# Patient Record
Sex: MALE | Race: WHITE | NOT HISPANIC OR LATINO | Employment: FULL TIME | URBAN - METROPOLITAN AREA
[De-identification: names, ages, dates, MRNs, and addresses within clinical notes are randomized per-mention and may not be internally consistent; named-entity substitution may affect disease eponyms.]

---

## 2021-10-12 ENCOUNTER — HOSPITAL ENCOUNTER (OUTPATIENT)
Dept: GENERAL RADIOLOGY | Facility: HOSPITAL | Age: 46
Discharge: HOME | End: 2021-10-12
Admitting: FAMILY MEDICINE

## 2021-10-12 DIAGNOSIS — U07.1 PNEUMONIA DUE TO COVID-19 VIRUS: ICD-10-CM

## 2021-10-12 DIAGNOSIS — J12.82 PNEUMONIA DUE TO COVID-19 VIRUS: ICD-10-CM

## 2021-10-12 PROCEDURE — 71046 X-RAY EXAM CHEST 2 VIEWS: CPT

## 2021-11-30 ENCOUNTER — OFFICE VISIT (OUTPATIENT)
Dept: FAMILY MEDICINE CLINIC | Facility: CLINIC | Age: 46
End: 2021-11-30

## 2021-11-30 VITALS
HEART RATE: 82 BPM | OXYGEN SATURATION: 98 % | TEMPERATURE: 97.1 F | RESPIRATION RATE: 16 BRPM | WEIGHT: 233 LBS | BODY MASS INDEX: 31.6 KG/M2 | DIASTOLIC BLOOD PRESSURE: 84 MMHG | SYSTOLIC BLOOD PRESSURE: 140 MMHG

## 2021-11-30 DIAGNOSIS — N52.9 ERECTILE DYSFUNCTION, UNSPECIFIED ERECTILE DYSFUNCTION TYPE: Primary | ICD-10-CM

## 2021-11-30 DIAGNOSIS — R03.0 ELEVATED BP WITHOUT DIAGNOSIS OF HYPERTENSION: ICD-10-CM

## 2021-11-30 DIAGNOSIS — T78.40XA ALLERGY, INITIAL ENCOUNTER: ICD-10-CM

## 2021-11-30 DIAGNOSIS — F32.A DEPRESSION, UNSPECIFIED DEPRESSION TYPE: ICD-10-CM

## 2021-11-30 PROCEDURE — 99213 OFFICE O/P EST LOW 20 MIN: CPT

## 2021-11-30 RX ORDER — DESVENLAFAXINE 100 MG/1
50 TABLET, EXTENDED RELEASE ORAL DAILY
COMMUNITY
Start: 2021-11-22 | End: 2021-11-30 | Stop reason: SDUPTHER

## 2021-11-30 RX ORDER — SILDENAFIL 50 MG/1
50 TABLET, FILM COATED ORAL DAILY PRN
COMMUNITY
End: 2021-11-30 | Stop reason: SDUPTHER

## 2021-11-30 RX ORDER — BUPROPION HYDROCHLORIDE 300 MG/1
300 TABLET ORAL DAILY
COMMUNITY
Start: 2021-11-17

## 2021-11-30 RX ORDER — SILDENAFIL 50 MG/1
50 TABLET, FILM COATED ORAL DAILY PRN
Qty: 30 TABLET | Refills: 3 | Status: SHIPPED | OUTPATIENT
Start: 2021-11-30 | End: 2022-04-18 | Stop reason: SDUPTHER

## 2021-11-30 NOTE — ASSESSMENT & PLAN NOTE
Stable on Viagra.  Patient has no cardiac history and is not on any nitrates.  Patient requesting refill today.  Reordering.  Counseled patient not to take more than 100 mg in 1 dose and not more than once a day.

## 2021-11-30 NOTE — PROGRESS NOTES
Chief Complaint  Establish Care (wants to get Psa and other blood work that is required checked, Has a red sore spot at the end of his nose ) and Sore Throat (Congestion,)    Subjective          Henry Wade presents to [facility]  History of Present Illness     Patient presents the office today to establish care.  Patient was previously seeing a provider that was in private practice and is 1 to go somewhere with a new location.  Does not have old records at hand right now.    Current medical issues include depression and erectile dysfunction.  Reviewed past medical, surgical, family history along with allergies and medications.    Patient has a history of depression following the death of his wife last December.  The death was sudden traumatic due to close brain injury and patient says since then he has been seeing a grief counselor and psychiatrist which have both greatly helped him.  Patient is currently taking Pristiq 50 mg and Wellbutrin 300 mg daily and is tolerating it well.  No suicidal thoughts.  Patient feels like he is doing very well with this regimen.    Patient said he also has a history of erectile dysfunction.  He said this is a new issue since the death of his wife and feels like it might be related.  Patient was [facility]  Cannot remember the name of the physician at this time.  At that time he started on Viagra and he says he has to take up to 100 mg in order for it to work.  Patient also said he had his testosterone tested at that visit and it was normal.  Patient was wondering about getting a refill of Viagra today.  Patient is already previously had a full cardiac work-up including EKG and stress test through Dr. TERRAZAS in 2019 and all was clear.  Patient tolerates Viagra well with just some flushing with the medication.    Patient is also seen that he has a dry scratchy throat that he has had recently.  No associated  fever, chills, muscle aches or sinus pressure congestion.  Patient believes it may be allergy related at this time.    Blood pressure slightly elevated in the office today 140/84.  Patient usually has blood pressure is not that high.  However he has recently increased his Wellbutrin dose and wonders if it is associated with that.  No chest pain, headache, dizziness or shortness of breath.      Objective   Vital Signs:   /84   Pulse 82   Temp 97.1 °F (36.2 °C)   Resp 16   Wt 106 kg (233 lb)   SpO2 98%   BMI 31.60 kg/m²     Physical Exam  Vitals reviewed.   Constitutional:       General: He is not in acute distress.  HENT:      Head: Normocephalic.   Cardiovascular:      Rate and Rhythm: Normal rate.      Heart sounds: Normal heart sounds.   Pulmonary:      Effort: Pulmonary effort is normal.      Breath sounds: Normal breath sounds.   Musculoskeletal:         General: Normal range of motion.   Skin:     General: Skin is warm and dry.   Neurological:      Mental Status: He is alert.   Psychiatric:         Mood and Affect: Mood normal.        Result Review :                 Assessment and Plan    Diagnoses and all orders for this visit:    1. Erectile dysfunction, unspecified erectile dysfunction type (Primary)  Assessment & Plan:  Stable on Viagra.  Patient has no cardiac history and is not on any nitrates.  Patient requesting refill today.  Reordering.  Counseled patient not to take more than 100 mg in 1 dose and not more than once a day.    Orders:  -     sildenafil (VIAGRA) 50 MG tablet; Take 1 tablet by mouth Daily As Needed for Erectile Dysfunction.  Dispense: 30 tablet; Refill: 3    2. Depression, unspecified depression type  Assessment & Plan:  Well-controlled on Pristiq and Wellbutrin.  Continue current regimen follow-up with psychiatry.      3. Elevated BP without diagnosis of hypertension  Assessment & Plan:  Counseled patient to monitor blood pressure at home.  We will follow-up in 3 months and  see how blood pressure is running.  If it is remaining elevated may need to start medication.      4. Allergy, initial encounter  Assessment & Plan:  At this time since patient has no other associated symptoms the irritated throat is probably due to allergies.  Continue any over-the-counter allergy medication and follow-up if it does not get better.      Pleasant 46-year-old male presents the clinic today to establish care.  Current medical issues include erectile dysfunction, depression, elevated blood pressure and allergies.  See plan above.  Patient to follow-up in 3 months for annual physical exam with lab work.      Follow Up   Return in about 3 months (around 2/28/2022) for Annual physical.  Patient was given instructions and counseling regarding his condition or for health maintenance advice. Please see specific information pulled into the AVS if appropriate.     Medical assistant and I wore mask and eyewear protection during entire encounter.  Patient wore mask.      Electronically signed by JUNE Olivares, 11/30/21, 4:20 PM EST.

## 2021-11-30 NOTE — ASSESSMENT & PLAN NOTE
At this time since patient has no other associated symptoms the irritated throat is probably due to allergies.  Continue any over-the-counter allergy medication and follow-up if it does not get better.

## 2021-11-30 NOTE — ASSESSMENT & PLAN NOTE
Counseled patient to monitor blood pressure at home.  We will follow-up in 3 months and see how blood pressure is running.  If it is remaining elevated may need to start medication.

## 2022-03-02 ENCOUNTER — TELEPHONE (OUTPATIENT)
Dept: FAMILY MEDICINE CLINIC | Facility: CLINIC | Age: 47
End: 2022-03-02

## 2022-03-02 NOTE — TELEPHONE ENCOUNTER
Patient will need to have testosterone levels checked and further testing prior to starting testosterone therapy. It looks like he has an appointment with his PCP on 3/15.     Electronically signed by JUNE Zelaya, 03/02/22, 2:54 PM EST.

## 2022-03-02 NOTE — TELEPHONE ENCOUNTER
Provider: PENNIE BOSWELL  Caller: ROBBIE GODOY  Relationship to Patient: PATIENT  Phone Number: [phone]  Reason for Call: PATIENT WOULD LIKE TO KNOW IF HE CAN GET TESTOSTERONE REPLACEMENT THERAPY HERE AT THE OFFICE.     PLEASE CALL AND ADVISE

## 2022-03-15 ENCOUNTER — OFFICE VISIT (OUTPATIENT)
Dept: FAMILY MEDICINE CLINIC | Facility: CLINIC | Age: 47
End: 2022-03-15

## 2022-03-15 VITALS
HEIGHT: 72 IN | SYSTOLIC BLOOD PRESSURE: 120 MMHG | TEMPERATURE: 97.3 F | WEIGHT: 235 LBS | RESPIRATION RATE: 16 BRPM | BODY MASS INDEX: 31.83 KG/M2 | OXYGEN SATURATION: 99 % | HEART RATE: 59 BPM | DIASTOLIC BLOOD PRESSURE: 84 MMHG

## 2022-03-15 DIAGNOSIS — R53.82 CHRONIC FATIGUE: Primary | ICD-10-CM

## 2022-03-15 DIAGNOSIS — F32.A DEPRESSION, UNSPECIFIED DEPRESSION TYPE: ICD-10-CM

## 2022-03-15 DIAGNOSIS — Z12.11 COLON CANCER SCREENING: ICD-10-CM

## 2022-03-15 DIAGNOSIS — R39.198 URINARY STREAM SLOWING: ICD-10-CM

## 2022-03-15 DIAGNOSIS — Z11.59 ENCOUNTER FOR HEPATITIS C SCREENING TEST FOR LOW RISK PATIENT: ICD-10-CM

## 2022-03-15 DIAGNOSIS — R68.82 LOW LIBIDO: ICD-10-CM

## 2022-03-15 DIAGNOSIS — M54.50 ACUTE RIGHT-SIDED LOW BACK PAIN WITHOUT SCIATICA: ICD-10-CM

## 2022-03-15 DIAGNOSIS — Z12.5 SCREENING PSA (PROSTATE SPECIFIC ANTIGEN): ICD-10-CM

## 2022-03-15 DIAGNOSIS — Z13.6 SCREENING, ISCHEMIC HEART DISEASE: ICD-10-CM

## 2022-03-15 PROCEDURE — 99214 OFFICE O/P EST MOD 30 MIN: CPT

## 2022-03-15 RX ORDER — DESVENLAFAXINE 100 MG/1
1 TABLET, EXTENDED RELEASE ORAL DAILY
COMMUNITY
Start: 2022-01-03

## 2022-03-15 RX ORDER — CYCLOBENZAPRINE HCL 5 MG
5 TABLET ORAL 3 TIMES DAILY PRN
Qty: 30 TABLET | Refills: 0 | Status: SHIPPED | OUTPATIENT
Start: 2022-03-15 | End: 2022-03-24 | Stop reason: SDUPTHER

## 2022-03-15 NOTE — ASSESSMENT & PLAN NOTE
This has been a chronic issue of the past several years that is just slowly getting worse over time.  No acute issues.  Since patient already is seeing neurologist counseled him to follow-up with his urologist.  Checking a PSA with his lab work.

## 2022-03-15 NOTE — PROGRESS NOTES
Chief Complaint  Labs Only (Testing for Low T levels. Some blood panel, discuss prostate check.) and Back Pain (Low back side shooting pain radiates down leg, when walking started this morning.)    Subjective          Henry Wade presents to [facility]  History of Present Illness     Patient presents the office today to discuss fatigue, low back pain, and new urinary symptoms.  Patient was last seen by me 3 months ago where we discussed his depression, erectile dysfunction, and elevated blood pressure.  Today blood pressure is well controlled without any medication.  Patient does see a urologist yearly for his rectal dysfunction and is on the Viagra as needed which he tolerates well with no side effects.  For patient's depression he is seeing psychiatry every 3 months and currently on Pristiq 100 and Wellbutrin 300.  Tolerating both these fine no suicidal thoughts.    Fatigue-patient feels like he has low energy along with low libido and low drive.  Patient's wife did suddenly passed away 15 months ago and this may all be related to continuing the grieving process and some depression from it.  However patient would also like to get his testosterone levels checked and see if there could be another cause of his fatigue.  Patient denies any dry skin, constipation, or weight gain.    Patient also said that 2 days ago he felt like he strained the right side of his low back when doing dead lifts while working out.  This morning he woke up and had a sharp pain in his right lower back that radiates down the back of his right thigh.  Patient denies any numbness or tingling in the groin, loss of bowel or bladder function, or weakness associated.  Patient feels like he probably just pulled something and it will ease up over time.  Patient did take some ibuprofen this morning which was helpful.  However patient was wondering about other medication that could help with the pain until this  "resolves.  Patient still has full range of motion but does have pinching down the leg with straightening out the right leg.    Patient also wanted to discuss slowing of urinary stream.  Patient said that this is not an acute issue but over the past several years he is noticed that it slowly started to slow down.  Patient is worried about possible development of benign prostatic hyperplasia.  Patient does see a urologist yearly and is needing to follow-up with them to discuss this further.  No acute pain, trouble urinating, dribbling, urgency, or fever.      Objective   Vital Signs:   /84   Pulse 59   Temp 97.3 °F (36.3 °C)   Resp 16   Ht 182.9 cm (72\")   Wt 107 kg (235 lb)   SpO2 99%   BMI 31.87 kg/m²     Physical Exam  Vitals reviewed.   Constitutional:       General: He is not in acute distress.  HENT:      Head: Normocephalic.   Cardiovascular:      Rate and Rhythm: Normal rate and regular rhythm.      Pulses: Normal pulses.      Heart sounds: Normal heart sounds.   Pulmonary:      Effort: Pulmonary effort is normal.      Breath sounds: Normal breath sounds.   Musculoskeletal:      Lumbar back: Tenderness present. No swelling or deformity. Positive right straight leg raise test.   Skin:     General: Skin is dry.   Neurological:      General: No focal deficit present.      Mental Status: He is alert.   Psychiatric:         Mood and Affect: Mood normal.        Result Review :                 Assessment and Plan    Diagnoses and all orders for this visit:    1. Chronic fatigue (Primary)  Assessment & Plan:  Patient has had chronic fatigue for the past year at least.  Patient also has been struggling with depression which is mostly controlled and managed with psychiatry.  However he wants to look into other reasons for the fatigue.  Patient returning for lab only appointment fasting.  We will follow-up when labs have resulted.    Orders:  -     TSH; Future  -     CBC and Differential; Future  -     " Comprehensive metabolic panel; Future  -     Iron; Future  -     Testosterone, free, total; Future  -     Vitamin B12; Future  -     Vitamin D 1,25 dihydroxy; Future    2. Low libido  Assessment & Plan:  Checking lab work including testosterone.  Discussed that patient may need to also talk to his psychiatrist about his medication regimen since antidepressants can also lead to low libido.    Orders:  -     Testosterone, free, total; Future    3. Acute right-sided low back pain without sciatica  Assessment & Plan:  Patient does have range of motion and no red flags.  Probably just pulled a muscle while working out.  Sending in diclofenac and Flexeril for the pain.  Counseled patient to not take diclofenac with any other NSAIDs and to watch for any signs or symptoms of bleeding.  Also to only take Flexeril at home that can cause you to be drowsy.  Gave patient handout for low back pain exercises and stretches.  Following up with patient in 1 month to see how pain is progressed.  Potentially will need physical therapy referral if not getting any better.    Orders:  -     diclofenac (VOLTAREN) 50 MG EC tablet; Take 1 tablet by mouth 2 (Two) Times a Day for 10 days.  Dispense: 20 tablet; Refill: 0  -     cyclobenzaprine (FLEXERIL) 5 MG tablet; Take 1 tablet by mouth 3 (Three) Times a Day As Needed for Muscle Spasms.  Dispense: 30 tablet; Refill: 0    4. Urinary stream slowing  Assessment & Plan:  This has been a chronic issue of the past several years that is just slowly getting worse over time.  No acute issues.  Since patient already is seeing neurologist counseled him to follow-up with his urologist.  Checking a PSA with his lab work.    Orders:  -     PSA Screen; Future    5. Depression, unspecified depression type  Assessment & Plan:  Currently managed by psychiatry.  Patient overall feels like it is well controlled with no suicidal thoughts.  However he is concerned that his medication regimen may be contributing  to his fatigue and low libido.  Discussed talking to his psychiatrist about this.      6. Colon cancer screening  -     Cologuard - Stool, Per Rectum; Future    7. Screening, ischemic heart disease  -     Lipid Panel; Future    8. Encounter for hepatitis C screening test for low risk patient  -     Hepatitis C antibody; Future    9. Screening PSA (prostate specific antigen)  -     PSA Screen; Future      Follow Up   Return in about 1 month (around 4/15/2022) for Annual physical.  Patient was given instructions and counseling regarding his condition or for health maintenance advice. Please see specific information pulled into the AVS if appropriate.     Medical assistant and I wore mask and eyewear protection during entire encounter.  Patient wore mask.      Electronically signed by JUNE Olivares, 03/15/22, 2:22 PM EDT.

## 2022-03-15 NOTE — ASSESSMENT & PLAN NOTE
Patient has had chronic fatigue for the past year at least.  Patient also has been struggling with depression which is mostly controlled and managed with psychiatry.  However he wants to look into other reasons for the fatigue.  Patient returning for lab only appointment fasting.  We will follow-up when labs have resulted.

## 2022-03-15 NOTE — ASSESSMENT & PLAN NOTE
Patient does have range of motion and no red flags.  Probably just pulled a muscle while working out.  Sending in diclofenac and Flexeril for the pain.  Counseled patient to not take diclofenac with any other NSAIDs and to watch for any signs or symptoms of bleeding.  Also to only take Flexeril at home that can cause you to be drowsy.  Gave patient handout for low back pain exercises and stretches.  Following up with patient in 1 month to see how pain is progressed.  Potentially will need physical therapy referral if not getting any better.

## 2022-03-15 NOTE — ASSESSMENT & PLAN NOTE
Currently managed by psychiatry.  Patient overall feels like it is well controlled with no suicidal thoughts.  However he is concerned that his medication regimen may be contributing to his fatigue and low libido.  Discussed talking to his psychiatrist about this.

## 2022-03-15 NOTE — ASSESSMENT & PLAN NOTE
Checking lab work including testosterone.  Discussed that patient may need to also talk to his psychiatrist about his medication regimen since antidepressants can also lead to low libido.

## 2022-03-16 ENCOUNTER — TELEPHONE (OUTPATIENT)
Dept: FAMILY MEDICINE CLINIC | Facility: CLINIC | Age: 47
End: 2022-03-16

## 2022-03-16 DIAGNOSIS — M54.41 ACUTE RIGHT-SIDED LOW BACK PAIN WITH RIGHT-SIDED SCIATICA: Primary | ICD-10-CM

## 2022-03-16 DIAGNOSIS — Z13.6 SCREENING, ISCHEMIC HEART DISEASE: ICD-10-CM

## 2022-03-16 DIAGNOSIS — R68.82 LOW LIBIDO: ICD-10-CM

## 2022-03-16 DIAGNOSIS — R53.82 CHRONIC FATIGUE: ICD-10-CM

## 2022-03-16 DIAGNOSIS — Z12.5 SCREENING PSA (PROSTATE SPECIFIC ANTIGEN): ICD-10-CM

## 2022-03-16 DIAGNOSIS — R39.198 URINARY STREAM SLOWING: ICD-10-CM

## 2022-03-16 DIAGNOSIS — Z11.59 ENCOUNTER FOR HEPATITIS C SCREENING TEST FOR LOW RISK PATIENT: ICD-10-CM

## 2022-03-16 RX ORDER — METHYLPREDNISOLONE 4 MG/1
TABLET ORAL
Qty: 1 EACH | Refills: 0 | Status: SHIPPED | OUTPATIENT
Start: 2022-03-16

## 2022-03-16 NOTE — TELEPHONE ENCOUNTER
Have patient stop Voltaren and I will place order for Medrol Dosepak to see if this can help with inflammation in his back causing the pain.  If this medication does not help with pain I would highly suggest patient proceed with physical therapy.  Also return to the clinic so that we can do further imaging.

## 2022-03-16 NOTE — TELEPHONE ENCOUNTER
Caller: Henry Wade    Relationship: Self    Best call back number: [phone]    What medication are you requesting:     What are your current symptoms: PAIN IN BACK    How long have you been experiencing symptoms:     Have you had these symptoms before:    [x] Yes  [] No    Have you been treated for these symptoms before:   [x] Yes  [] No    If a prescription is needed, what is your preferred pharmacy and phone number:  [facility]  [address] [place] [address]  [phone]    Additional notes:  PATIENT STATES THAT THE MEDICATION THAT DR BOSWELL HAS HIM ON FOR HIS BACK PAIN IS NOT WORKING AT ALL.  HE IS REQUESTING SOMETHING STRONGER TO HELP WITH THE PAIN.

## 2022-03-23 ENCOUNTER — TELEPHONE (OUTPATIENT)
Dept: FAMILY MEDICINE CLINIC | Facility: CLINIC | Age: 47
End: 2022-03-23

## 2022-03-23 NOTE — TELEPHONE ENCOUNTER
For pain at this time I would recommend anti-inflammatories and Tylenol.  Since we just gave a dose of steroids it would be too soon to do another dose.  I would highly recommend seeing physical therapy on top of chiropractics.  Physical therapy can greatly help pain related to sciatica.  Let me know if he would like to see physical therapy along with chiropractics and I can place the order.  I know patient mentioned the diclofenac was not helpful for the pain.  Has the Flexeril helped at all?

## 2022-03-23 NOTE — TELEPHONE ENCOUNTER
Patient states he see's the Chiropractor 2-3 days a week, pain is still there, but they are working on that area. He can't sleep due to the nerve pain.

## 2022-03-23 NOTE — TELEPHONE ENCOUNTER
If lack of sleep is due to the pain we really need to get the pain treated better.  Has patient started to see physical therapy or does he need a referral to physical therapy?

## 2022-03-23 NOTE — TELEPHONE ENCOUNTER
Patient stopped by the office to inform Donna that he is having difficulty sleeping due to the sharp pain that radiates from lower back down leg. He is curious if Donna can prescribe something for sleep? Please send response to clinical. Please advise.

## 2022-03-24 ENCOUNTER — TELEPHONE (OUTPATIENT)
Dept: FAMILY MEDICINE CLINIC | Facility: CLINIC | Age: 47
End: 2022-03-24

## 2022-03-24 DIAGNOSIS — M54.50 ACUTE RIGHT-SIDED LOW BACK PAIN WITHOUT SCIATICA: ICD-10-CM

## 2022-03-24 NOTE — TELEPHONE ENCOUNTER
Pt informed, he has an appt for PT next week. He said Flexeril is barely helping him, still cant sleep due to nerve pain very sharp. He said he will try to see someone else.

## 2022-03-24 NOTE — TELEPHONE ENCOUNTER
Caller: Henry Wade    Relationship: Self    Best call back number: [phone]    Requested Prescriptions:   Requested Prescriptions     Pending Prescriptions Disp Refills   • cyclobenzaprine (FLEXERIL) 5 MG tablet 30 tablet 0     Sig: Take 1 tablet by mouth 3 (Three) Times a Day As Needed for Muscle Spasms.        Pharmacy where request should be sent: [id number] [address] AT [facility] [address] - [phone] [facility] [phone] FX     Additional details provided by patient:     Does the patient have less than a 3 day supply:  [x] Yes  [] No    Robert Daigle Rep   03/24/22 15:12 EDT

## 2022-03-25 RX ORDER — CYCLOBENZAPRINE HCL 5 MG
5 TABLET ORAL 3 TIMES DAILY PRN
Qty: 30 TABLET | Refills: 0 | Status: SHIPPED | OUTPATIENT
Start: 2022-03-25

## 2022-03-25 NOTE — TELEPHONE ENCOUNTER
Rx Refill Note  Requested Prescriptions     Pending Prescriptions Disp Refills   • cyclobenzaprine (FLEXERIL) 5 MG tablet 30 tablet 0     Sig: Take 1 tablet by mouth 3 (Three) Times a Day As Needed for Muscle Spasms.      Last office visit with prescribing clinician: 3/15/2022      Next office visit with prescribing clinician: 3/24/2022            Bonny Chacon MA  03/25/22, 08:01 EDT

## 2022-03-28 NOTE — TELEPHONE ENCOUNTER
Patient stated pain has changed, starting to get better now since he has been going to the Chiropractor and PT.

## 2022-03-28 NOTE — TELEPHONE ENCOUNTER
Patient probably needs imaging of the low back done to assess for cause for his low back pain since it is not getting any better.  If he is willing to he can come to see me again so we can do an x-ray of his low back.  Otherwise I would also recommend following up with an orthopedic specialist.

## 2022-03-29 ENCOUNTER — TELEPHONE (OUTPATIENT)
Dept: FAMILY MEDICINE CLINIC | Facility: CLINIC | Age: 47
End: 2022-03-29

## 2022-03-29 NOTE — TELEPHONE ENCOUNTER
----- Message from Paulina Aviles sent at 3/29/2022  2:17 PM EDT -----  Regarding: please reschedule for Donna on April 20  We can offer the same day in the morning or a different day in the afternoon

## 2022-04-01 ENCOUNTER — TELEPHONE (OUTPATIENT)
Dept: FAMILY MEDICINE CLINIC | Facility: CLINIC | Age: 47
End: 2022-04-01

## 2022-04-01 DIAGNOSIS — M54.50 ACUTE RIGHT-SIDED LOW BACK PAIN WITHOUT SCIATICA: Primary | ICD-10-CM

## 2022-04-03 LAB
1,25(OH)2D SERPL-MCNC: 46.1 PG/ML (ref 19.9–79.3)
ALBUMIN SERPL-MCNC: 4.6 G/DL (ref 4–5)
ALBUMIN/GLOB SERPL: 1.7 {RATIO} (ref 1.2–2.2)
ALP SERPL-CCNC: 61 IU/L (ref 44–121)
ALT SERPL-CCNC: 38 IU/L (ref 0–44)
AST SERPL-CCNC: 43 IU/L (ref 0–40)
BASOPHILS # BLD AUTO: 0 X10E3/UL (ref 0–0.2)
BASOPHILS NFR BLD AUTO: 1 %
BILIRUB SERPL-MCNC: 0.5 MG/DL (ref 0–1.2)
BUN SERPL-MCNC: 20 MG/DL (ref 6–24)
BUN/CREAT SERPL: 17 (ref 9–20)
CALCIUM SERPL-MCNC: 9.5 MG/DL (ref 8.7–10.2)
CHLORIDE SERPL-SCNC: 98 MMOL/L (ref 96–106)
CHOLEST SERPL-MCNC: 213 MG/DL (ref 100–199)
CO2 SERPL-SCNC: 24 MMOL/L (ref 20–29)
CREAT SERPL-MCNC: 1.2 MG/DL (ref 0.76–1.27)
EGFRCR SERPLBLD CKD-EPI 2021: 76 ML/MIN/1.73
EOSINOPHIL # BLD AUTO: 0.2 X10E3/UL (ref 0–0.4)
EOSINOPHIL NFR BLD AUTO: 5 %
ERYTHROCYTE [DISTWIDTH] IN BLOOD BY AUTOMATED COUNT: 13 % (ref 11.6–15.4)
GLOBULIN SER CALC-MCNC: 2.7 G/DL (ref 1.5–4.5)
GLUCOSE SERPL-MCNC: 97 MG/DL (ref 65–99)
HCT VFR BLD AUTO: 46.1 % (ref 37.5–51)
HCV AB S/CO SERPL IA: <0.1 S/CO RATIO (ref 0–0.9)
HDLC SERPL-MCNC: 55 MG/DL
HGB BLD-MCNC: 15.4 G/DL (ref 13–17.7)
IMM GRANULOCYTES # BLD AUTO: 0 X10E3/UL (ref 0–0.1)
IMM GRANULOCYTES NFR BLD AUTO: 0 %
IRON SERPL-MCNC: 88 UG/DL (ref 38–169)
LDLC SERPL CALC-MCNC: 140 MG/DL (ref 0–99)
LYMPHOCYTES # BLD AUTO: 1.3 X10E3/UL (ref 0.7–3.1)
LYMPHOCYTES NFR BLD AUTO: 35 %
MCH RBC QN AUTO: 30.1 PG (ref 26.6–33)
MCHC RBC AUTO-ENTMCNC: 33.4 G/DL (ref 31.5–35.7)
MCV RBC AUTO: 90 FL (ref 79–97)
MONOCYTES # BLD AUTO: 0.4 X10E3/UL (ref 0.1–0.9)
MONOCYTES NFR BLD AUTO: 10 %
NEUTROPHILS # BLD AUTO: 1.8 X10E3/UL (ref 1.4–7)
NEUTROPHILS NFR BLD AUTO: 49 %
PLATELET # BLD AUTO: 223 X10E3/UL (ref 150–450)
POTASSIUM SERPL-SCNC: 4.6 MMOL/L (ref 3.5–5.2)
PROT SERPL-MCNC: 7.3 G/DL (ref 6–8.5)
PSA SERPL-MCNC: 0.7 NG/ML (ref 0–4)
RBC # BLD AUTO: 5.12 X10E6/UL (ref 4.14–5.8)
SODIUM SERPL-SCNC: 138 MMOL/L (ref 134–144)
TRIGL SERPL-MCNC: 101 MG/DL (ref 0–149)
TSH SERPL DL<=0.005 MIU/L-ACNC: 1.49 UIU/ML (ref 0.45–4.5)
VIT B12 SERPL-MCNC: 1033 PG/ML (ref 232–1245)
VLDLC SERPL CALC-MCNC: 18 MG/DL (ref 5–40)
WBC # BLD AUTO: 3.6 X10E3/UL (ref 3.4–10.8)

## 2022-04-04 ENCOUNTER — TELEPHONE (OUTPATIENT)
Dept: FAMILY MEDICINE CLINIC | Facility: CLINIC | Age: 47
End: 2022-04-04

## 2022-04-04 NOTE — TELEPHONE ENCOUNTER
Caller: Henry Wade    Relationship: Self    Best call back number: [phone]     What test was performed: LABS    When was the test performed: 04/01/22    Where was the test performed: OFFICE    Additional notes: PATIENT SAID THEY WERE SUPPOSED TO CHECK HIS TESTOSTERONE BUT THERE WAS NOT AN ORDER FOR THAT.

## 2022-04-18 DIAGNOSIS — N52.9 ERECTILE DYSFUNCTION, UNSPECIFIED ERECTILE DYSFUNCTION TYPE: ICD-10-CM

## 2022-04-18 RX ORDER — SILDENAFIL 50 MG/1
50 TABLET, FILM COATED ORAL DAILY PRN
Qty: 30 TABLET | Refills: 3 | Status: SHIPPED | OUTPATIENT
Start: 2022-04-18

## 2022-04-18 NOTE — TELEPHONE ENCOUNTER
Caller: Alina Wadeon    Relationship: Self    Best call back number: [phone]    Requested Prescriptions:   Requested Prescriptions     Pending Prescriptions Disp Refills   • sildenafil (VIAGRA) 50 MG tablet 30 tablet 3     Sig: Take 1 tablet by mouth Daily As Needed for Erectile Dysfunction.        Pharmacy where request should be sent: [facility] #[id number] [address] AT [facility] & [place] - [phone] [facility] [phone]      Additional details provided by patient: PATIENT IS OUT OF THIS MEDICATION. [facility] HAS ONLY BEEN [facility] TABLETS AT A TIME.     Does the patient have less than a 3 day supply:  [x] Yes  [] No    Robert Lima Rep   04/18/22 13:23 EDT

## 2022-04-19 ENCOUNTER — PRIOR AUTHORIZATION (OUTPATIENT)
Dept: FAMILY MEDICINE CLINIC | Facility: CLINIC | Age: 47
End: 2022-04-19

## 2022-04-19 ENCOUNTER — TELEPHONE (OUTPATIENT)
Dept: FAMILY MEDICINE CLINIC | Facility: CLINIC | Age: 47
End: 2022-04-19

## 2022-04-19 NOTE — TELEPHONE ENCOUNTER
Caller: Henry Wade    Relationship: Self    Best call back number:[phone]    What is the best time to reach you: ANY    Who are you requesting to speak with (clinical staff, provider,  specific staff member): CLINICAL STAFF    Do you know the name of the person who called: PATIENT    What was the call regarding: PATIENT ADVISED THAT THEY NEED PA ON sildenafil (VIAGRA) 50 MG tablet. THEY ONLY PRESCRIBE 8 PILLS AT A TIME.  SO HE NEEDS PA TO GET A 30 DAY PRESCRIPTION    Do you require a callback: YES

## 2022-04-20 ENCOUNTER — TELEPHONE (OUTPATIENT)
Dept: FAMILY MEDICINE CLINIC | Facility: CLINIC | Age: 47
End: 2022-04-20

## 2022-04-20 DIAGNOSIS — N52.9 ERECTILE DYSFUNCTION, UNSPECIFIED ERECTILE DYSFUNCTION TYPE: ICD-10-CM

## 2022-04-20 RX ORDER — SILDENAFIL 50 MG/1
50 TABLET, FILM COATED ORAL DAILY PRN
Qty: 30 TABLET | Refills: 3 | Status: CANCELLED | OUTPATIENT
Start: 2022-04-20

## 2022-04-20 NOTE — TELEPHONE ENCOUNTER
Caller: Mauro Henry    Relationship: Self    Best call back number: [phone]    Requested Prescriptions:   Requested Prescriptions     Pending Prescriptions Disp Refills   • sildenafil (VIAGRA) 50 MG tablet 30 tablet 3     Sig: Take 1 tablet by mouth Daily As Needed for Erectile Dysfunction.        Pharmacy where request should be sent: [facility] #[id number] [facility] [id number] [place] [address] AT [facility] & [place] - [phone] [facility] [phone] FX     Additional details provided by patient: PATIENT HAS NOT HEARD ANYTHING AND IS ASKING IF THIS WILL BE FILLED SOON.    PATIENT STATED THAT A PRIOR AUTH WAS NEEDED.    Does the patient have less than a 3 day supply:  [] Yes  [] No    Robert Abrams Rep   04/20/22 09:47 EDT